# Patient Record
Sex: MALE | Race: ASIAN | Employment: UNEMPLOYED | URBAN - METROPOLITAN AREA
[De-identification: names, ages, dates, MRNs, and addresses within clinical notes are randomized per-mention and may not be internally consistent; named-entity substitution may affect disease eponyms.]

---

## 2017-10-11 ENCOUNTER — TELEPHONE (OUTPATIENT)
Dept: FAMILY MEDICINE CLINIC | Facility: CLINIC | Age: 27
End: 2017-10-11

## 2017-10-11 NOTE — TELEPHONE ENCOUNTER
PT HAD AN 11AM WITH DR. NUÑEZ TODAY AND NO SHOWED. TRIED TO CALL HOME # AND IT IS DSCONNECTED. PLEASE GET  NEW # FOR PATIENT.

## 2017-10-19 ENCOUNTER — OFFICE VISIT (OUTPATIENT)
Dept: FAMILY MEDICINE CLINIC | Facility: CLINIC | Age: 27
End: 2017-10-19

## 2017-10-19 VITALS
BODY MASS INDEX: 30.31 KG/M2 | SYSTOLIC BLOOD PRESSURE: 126 MMHG | DIASTOLIC BLOOD PRESSURE: 80 MMHG | RESPIRATION RATE: 16 BRPM | TEMPERATURE: 98 F | WEIGHT: 200 LBS | HEIGHT: 68 IN | HEART RATE: 90 BPM

## 2017-10-19 DIAGNOSIS — H61.23 BILATERAL IMPACTED CERUMEN: ICD-10-CM

## 2017-10-19 DIAGNOSIS — E66.9 OBESITY (BMI 30.0-34.9): ICD-10-CM

## 2017-10-19 DIAGNOSIS — Z00.00 LABORATORY EXAM ORDERED AS PART OF ROUTINE GENERAL MEDICAL EXAMINATION: ICD-10-CM

## 2017-10-19 DIAGNOSIS — Z00.00 ROUTINE MEDICAL EXAM: Primary | ICD-10-CM

## 2017-10-19 PROCEDURE — 99385 PREV VISIT NEW AGE 18-39: CPT | Performed by: PHYSICIAN ASSISTANT

## 2017-10-19 NOTE — PROGRESS NOTES
Carmen Granda is a 23year old male who presents for a complete physical exam.   HPI:   Pt complains of needing physical. Recently moved back here from [place]. No calcium and vit D.   Denies urinary symptoms  Denies penile discharge  Denies erectile dysfunc with impacted cerumen  EYES:PERRLA, EOMI, conjunctiva are clear  NECK: supple,no adenopathy  LUNGS: clear to auscultation  CARDIO: RRR without murmur, no edema b/l LE, distal pulses 2+ bilaterally  GI: good BS's,no masses, HSM or tenderness  : pt decline

## 2017-10-19 NOTE — PATIENT INSTRUCTIONS
Discuss flu vaccine. Check for date of last Tdap vaccine.    Debrox ear drops over the counter for 2-3 days and then follow up here for ear wax removal.

## 2018-04-13 ENCOUNTER — APPOINTMENT (OUTPATIENT)
Dept: OCCUPATIONAL MEDICINE | Age: 28
End: 2018-04-13
Attending: EMERGENCY MEDICINE

## 2018-05-07 ENCOUNTER — TELEPHONE (OUTPATIENT)
Dept: FAMILY MEDICINE CLINIC | Facility: CLINIC | Age: 28
End: 2018-05-07

## 2018-05-07 NOTE — TELEPHONE ENCOUNTER
We received form that patient needs completed. Did call patient and left message that there may be a fee. He was last seen on 10/17/17 with Sara Rodriguez. Form placed in triage bin. Let patient know when completed.

## 2018-05-07 NOTE — TELEPHONE ENCOUNTER
The form states it needs to be filled out by physician. Is it okay if I do the form? There are several questions on this form that the patient needs to be asked. Would recommend appointment.

## 2018-05-07 NOTE — TELEPHONE ENCOUNTER
Pt seen 10/17/17 for cpx are you able to fill our form to release him to go to gym? Form left in your inbox to review. If appt is required please let us know. Pt has been told there may be form fee.

## 2018-05-09 NOTE — TELEPHONE ENCOUNTER
LMOM for pt that scheduling OV is recommended and to schedule with one of our physicians. Form in Triage AV shelf.